# Patient Record
Sex: FEMALE | Race: WHITE
[De-identification: names, ages, dates, MRNs, and addresses within clinical notes are randomized per-mention and may not be internally consistent; named-entity substitution may affect disease eponyms.]

---

## 2019-01-25 ENCOUNTER — HOSPITAL ENCOUNTER (EMERGENCY)
Dept: HOSPITAL 91 - FTE | Age: 19
Discharge: HOME | End: 2019-01-25
Payer: COMMERCIAL

## 2019-01-25 ENCOUNTER — HOSPITAL ENCOUNTER (EMERGENCY)
Dept: HOSPITAL 10 - FTE | Age: 19
Discharge: HOME | End: 2019-01-25
Payer: MEDICAID

## 2019-01-25 VITALS — WEIGHT: 132.28 LBS | BODY MASS INDEX: 30.61 KG/M2 | HEIGHT: 55 IN

## 2019-01-25 DIAGNOSIS — R10.13: Primary | ICD-10-CM

## 2019-01-25 DIAGNOSIS — R40.2412: ICD-10-CM

## 2019-01-25 LAB — URINE PH (DIP) POC: 7 (ref 5–8.5)

## 2019-01-25 PROCEDURE — 99282 EMERGENCY DEPT VISIT SF MDM: CPT

## 2019-01-25 PROCEDURE — 81025 URINE PREGNANCY TEST: CPT

## 2019-01-25 PROCEDURE — 81003 URINALYSIS AUTO W/O SCOPE: CPT

## 2019-01-25 RX ADMIN — ALUMINUM HYDROXIDE, MAGNESIUM HYDROXIDE, DIMETHICONE 1 ML: 200; 200; 20 SUSPENSION ORAL at 19:12

## 2019-01-25 NOTE — ERD
ER Documentation


Chief Complaint


Chief Complaint





bib self, cc: epigastric pain x 1 month,





HPI


18-year-old female accompanied by her mother present ED complaining of 


epigastric pain since 1 PM this afternoon.  She described pain as burning-like 


sensation, and constant.  She reports feeling nauseous, but denies vomiting.  


Denies fever or chills.  Denies diarrhea.  Denies dysuria.  LMP 1/7/2018.  


Patient states that she has similar pains in the past.  Patient denies eating 


spicy or greasy foods, denies taking NSAIDs.





ROS


All systems reviewed and are negative except as per history of present illness.





Medications


Home Meds


Active Scripts


Ranitidine Hcl* (Zantac*) 150 Mg Tablet, 150 MG PO BID PRN for EPIGASTRIC PAIN, 


#30 TAB


   Prov:SHAAN AVERY. NP         1/25/19


Omeprazole* (Omeprazole*) 20 Mg Capsule.dr, 20 MG PO DAILY, #14


   Prov:SHAAN AVERY. NP         1/25/19





Allergies


Allergies:  


Coded Allergies:  


     No Known Allergy (Unverified , 1/25/19)





PMhx/Soc


Medical and Surgical Hx:  pt denies Medical Hx, pt denies Surgical Hx


Hx Alcohol Use:  No


Hx Substance Use:  No


Hx Tobacco Use:  No





Physical Exam


Vitals





Vital Signs


  Date      Temp  Pulse  Resp  B/P (MAP)   Pulse Ox  O2          O2 Flow    FiO2


Time                                                 Delivery    Rate


   1/25/19  98.2     59    19      134/88       100


     17:59                          (103)





Physical Exam


General:    Well-developed, well-nourished, conscious and coherent, in no di


stress


Skin:    Warm and dry without rash, good texture and turgor


Head:    Normocephalic without evidence of trauma


Chest:    Normal AP diameter.  Good expansion without retractions.  Nontender.  


Lungs are clear to auscultate bilaterally with good tidal volume


Heart:    Regular rate and rhythm.  No murmur, rub, or gallops heard


Abdomen:    Soft, epigastric tenderness and mild suprapubic tenderness without 


masses, guarding, or rebound.  Bowel sounds are active.  No hepatosplenomegaly


Back:    Without spinal or CVA tenderness


Extremities:    Full range of motion.  Good strength bilaterally.  No erythema, 


ecchymosis, or edema. Peripheral pulses are intact. Sensation intact


Neuro:    Alert and oriented 4, GCS 15.


Results 24 hrs





Laboratory Tests


       Test
                                1/25/19
19:15  1/25/19
19:17


       Bedside Urine pH (LAB)                        7.0


       Bedside Urine Protein (LAB)          Trace


       Bedside Urine Glucose (UA)           Negative


       Bedside Urine Ketones (LAB)          Trace


       Bedside Urine Blood                  Negative


       Bedside Urine Nitrite (LAB)          Negative


       Bedside Urine Leukocyte
Esterase (L  Negative 
     



       POC Beta HCG, Qualitative                           NEGATIVE





Current Medications


 Medications
   Dose
          Sig/Anupama
       Start Time
   Status  Last


 (Trade)       Ordered        Route
 PRN     Stop Time              Admin
Dose


                              Reason                                Admin


                40 ml          ONCE  ONCE
    1/25/19       DC           1/25/19


Miscellaneous                 PO
            19:30
                       19:12




 Medication
                                1/25/19 19:31


 (Gi Cocktail


(2))








Procedures/MDM


18-year-old female presents with epigastric pain times 1 day.  On exam she has 


epigastric and tenderness.  UA is negative for UTI, urine pregnancy is negative.


 Patient does not have any right lower quadrant or right upper quadrant 


tenderness.  Low suspicion for acute appendicitis, cholecystitis, pancreatitis, 


bowel obstruction, ectopic pregnancy, ovarian torsion, or ruptured ovarian cyst.


 Likely her epigastric pain is due to gastritis.  Patient given GI cocktail in 


the ED.  Patient reports improvement of pain after GI cocktail.





 Patient appears well, stable for discharge and outpatient management. Medical 


decision making shared with patient and family. Education provided to patient 


and family. Patient and family expressed understanding of the plan.





Medications on discharge: Omeprazole, ranitidine.


Follow-up: Primary care provider in 2-3 days or return to ED if worse.





Disclaimer: Inadvertent spelling and grammatical errors are likely due to 


EHR/dictation software use and do not reflect on the overall quality of patient 


care. Also, please note that the electronic time recorded on this note does not 


necessarily reflect the actual time of the patient encounter.





Departure


Diagnosis:  


   Primary Impression:  


   Epigastric pain


Condition:  Stable











SHAAN AVERY NP               Jan 25, 2019 19:13